# Patient Record
Sex: MALE | Race: WHITE | Employment: OTHER | URBAN - NONMETROPOLITAN AREA
[De-identification: names, ages, dates, MRNs, and addresses within clinical notes are randomized per-mention and may not be internally consistent; named-entity substitution may affect disease eponyms.]

---

## 2017-12-17 ENCOUNTER — APPOINTMENT (OUTPATIENT)
Dept: GENERAL RADIOLOGY | Age: 72
End: 2017-12-17
Payer: MEDICARE

## 2017-12-17 ENCOUNTER — HOSPITAL ENCOUNTER (EMERGENCY)
Age: 72
Discharge: HOME | End: 2017-12-17
Attending: EMERGENCY MEDICINE
Payer: MEDICARE

## 2017-12-17 VITALS
WEIGHT: 180 LBS | SYSTOLIC BLOOD PRESSURE: 180 MMHG | HEIGHT: 71 IN | RESPIRATION RATE: 20 BRPM | HEART RATE: 86 BPM | DIASTOLIC BLOOD PRESSURE: 76 MMHG | OXYGEN SATURATION: 93 % | TEMPERATURE: 97.1 F | BODY MASS INDEX: 25.2 KG/M2

## 2017-12-17 DIAGNOSIS — S80.12XA HEMATOMA OF LEG, LEFT, INITIAL ENCOUNTER: Primary | ICD-10-CM

## 2017-12-17 DIAGNOSIS — M84.364A STRESS FRACTURE OF LEFT FIBULA, INITIAL ENCOUNTER: ICD-10-CM

## 2017-12-17 LAB
HCT VFR BLD CALC: 42.8 % (ref 42–52)
HEMOGLOBIN: 14.1 G/DL (ref 14–18)
INR BLD: 3.15 (ref 0.88–1.18)
MCH RBC QN AUTO: 30.9 PG (ref 27–31)
MCHC RBC AUTO-ENTMCNC: 32.9 G/DL (ref 33–37)
MCV RBC AUTO: 93.7 FL (ref 80–94)
PDW BLD-RTO: 13.2 % (ref 11.5–14.5)
PLATELET # BLD: 236 K/UL (ref 130–400)
PMV BLD AUTO: 9.5 FL (ref 9.4–12.4)
PROTHROMBIN TIME: 32.8 SEC (ref 12–14.6)
RBC # BLD: 4.57 M/UL (ref 4.7–6.1)
WBC # BLD: 7.6 K/UL (ref 4.8–10.8)

## 2017-12-17 PROCEDURE — 85027 COMPLETE CBC AUTOMATED: CPT

## 2017-12-17 PROCEDURE — 99283 EMERGENCY DEPT VISIT LOW MDM: CPT | Performed by: EMERGENCY MEDICINE

## 2017-12-17 PROCEDURE — 99283 EMERGENCY DEPT VISIT LOW MDM: CPT

## 2017-12-17 PROCEDURE — 36415 COLL VENOUS BLD VENIPUNCTURE: CPT

## 2017-12-17 PROCEDURE — 85610 PROTHROMBIN TIME: CPT

## 2017-12-17 PROCEDURE — 73590 X-RAY EXAM OF LOWER LEG: CPT

## 2017-12-17 RX ORDER — WARFARIN SODIUM 5 MG/1
5 TABLET ORAL
COMMUNITY

## 2017-12-17 RX ORDER — LISINOPRIL 20 MG/1
10 TABLET ORAL DAILY
COMMUNITY

## 2017-12-17 RX ORDER — ACETAMINOPHEN 160 MG
1000 TABLET,DISINTEGRATING ORAL DAILY
COMMUNITY

## 2017-12-17 RX ORDER — CARVEDILOL 12.5 MG/1
12.5 TABLET ORAL 2 TIMES DAILY WITH MEALS
COMMUNITY

## 2017-12-17 RX ORDER — CHLORAL HYDRATE 500 MG
CAPSULE ORAL DAILY
COMMUNITY

## 2017-12-17 RX ORDER — WARFARIN SODIUM 7.5 MG/1
7.5 TABLET ORAL
COMMUNITY
End: 2019-07-30

## 2017-12-17 RX ORDER — ATORVASTATIN CALCIUM 80 MG/1
40 TABLET, FILM COATED ORAL DAILY
COMMUNITY

## 2017-12-17 ASSESSMENT — ENCOUNTER SYMPTOMS
SORE THROAT: 0
SHORTNESS OF BREATH: 0
CHEST TIGHTNESS: 0
BLOOD IN STOOL: 0
DIARRHEA: 0
CONSTIPATION: 0
ABDOMINAL PAIN: 0
NAUSEA: 0
VOMITING: 0
BACK PAIN: 0
RHINORRHEA: 0
COUGH: 0
TROUBLE SWALLOWING: 0
ABDOMINAL DISTENTION: 0

## 2017-12-17 NOTE — ED PROVIDER NOTES
[facility]  eMERGENCY dEPARTMENT eNCOUnter      Pt Name: Marietta Isaac  MRN: [id number]  Adengfcandy 1945  Date of evaluation: 12/17/2017  Provider: Tessie Douglas MD    CHIEF COMPLAINT       Chief Complaint   Patient presents with    Bleeding/Bruising       HISTORY OF PRESENT ILLNESS   (Location/Symptom, Timing/Onset, Context/Setting, Quality, Duration, Modifying Factors, Severity)  Note limiting factors. Marietta Isaac is a 71 y.o. male who presents to the emergency department With left medial distal tibial hematoma and bruising. Patient states that he noticed the swelling and bruising yesterday. Denies any known specific trauma to cause event. Concerned due to being on warfarin. States that it is a little bit sore, declining any medication for pain. Wife states that after patient had his valve [facility] he was told that if he had any bruising or bleeding that he was to report to the emergency department for evaluation. He denies any associated chest pain, difficulty breathing, abdominal pain, lightheadedness, rectal bleeding or blood in stools. States that the bruising has been tracking down the leg, no increasing size of the hematoma. No other complaints this point time. Nursing Notes were reviewed. REVIEW OF SYSTEMS    (2-9 systems for level 4, 10 or more for level 5)     Review of Systems   Constitutional: Negative for activity change, appetite change, chills and fever. HENT: Negative for congestion, ear pain, nosebleeds, rhinorrhea, sore throat and trouble swallowing. Respiratory: Negative for cough, chest tightness and shortness of breath. Cardiovascular: Negative for chest pain and palpitations. Gastrointestinal: Negative for abdominal distention, abdominal pain, blood in stool, constipation, diarrhea, nausea and vomiting. Genitourinary: Negative for difficulty urinating, dysuria and hematuria.    Musculoskeletal: Negative for arthralgias, back pain, joint swelling, myalgias and neck pain. Skin: Negative for rash. Positive ecchymosis and hematoma of the left lower extremity   Neurological: Negative for dizziness, weakness, light-headedness and headaches. Psychiatric/Behavioral: Negative for confusion, hallucinations, self-injury and suicidal ideas. PAST MEDICAL HISTORY   History reviewed. No pertinent past medical history. SURGICAL HISTORY     History reviewed. No pertinent surgical history. CURRENT MEDICATIONS       Current Discharge Medication List      CONTINUE these medications which have NOT CHANGED    Details   !! warfarin (COUMADIN) 5 MG tablet Take 5 mg by mouth      !! warfarin (COUMADIN) 7.5 MG tablet Take 7.5 mg by mouth      atorvastatin (LIPITOR) 80 MG tablet Take 40 mg by mouth daily      carvedilol (COREG) 12.5 MG tablet Take 12.5 mg by mouth 2 times daily (with meals)      aspirin 81 MG tablet Take 81 mg by mouth daily      Omega-3 Fatty Acids (FISH OIL) 1000 MG CAPS Take by mouth daily      lisinopril (PRINIVIL;ZESTRIL) 20 MG tablet Take 10 mg by mouth daily      Cholecalciferol (VITAMIN D3) 2000 units CAPS Take 1,000 Units by mouth daily       ! ! - Potential duplicate medications found. Please discuss with provider. ALLERGIES     Nystatin    FAMILY HISTORY     History reviewed. No pertinent family history.     SOCIAL HISTORY       Social History     Social History    Marital status:      Spouse name: N/A    Number of children: N/A    Years of education: N/A     Social History Main Topics    Smoking status: Never Smoker    Smokeless tobacco: Never Used    Alcohol use Yes    Drug use: No    Sexual activity: Not Asked     Other Topics Concern    None     Social History Narrative    None       SCREENINGS           PHYSICAL EXAM    (up to 7 for level 4, 8 or more for level 5)     ED Triage Vitals   BP Temp Temp src Pulse Resp SpO2 Height Weight   -- -- -- -- -- -- -- --       Physical Exam Constitutional: He is oriented to person, place, and time. He appears well-developed and well-nourished. No distress. HENT:   Head: Normocephalic and atraumatic. Mouth/Throat: Oropharynx is clear and moist.   Eyes: EOM are normal. Pupils are equal, round, and reactive to light. No scleral icterus. Neck: Normal range of motion. Neck supple. No tracheal deviation present. Cardiovascular: Normal rate, regular rhythm, normal heart sounds and intact distal pulses. Exam reveals no gallop and no friction rub. No murmur heard. 2+ dorsalis pedis and tibialis posterior on the left lower extremity   Pulmonary/Chest: Effort normal and breath sounds normal. No respiratory distress. He has no wheezes. He has no rales. He exhibits no tenderness. Abdominal: Soft. He exhibits no distension and no mass. There is no tenderness. There is no rebound and no guarding. Musculoskeletal: Normal range of motion. He exhibits no edema, tenderness or deformity. Neurological: He is alert and oriented to person, place, and time. No cranial nerve deficit. He exhibits normal muscle tone. Coordination normal.   Skin: Skin is warm and dry. No rash noted. Left medial distal tibia with small area of hematoma approximately 1 cm in diameter with overlying and surrounding ecchymosis that tracks down in to the foot and posterior tibial area. Psychiatric: He has a normal mood and affect. His behavior is normal. Judgment and thought content normal.   Nursing note and vitals reviewed.       DIAGNOSTIC RESULTS     EKG: All EKG's are interpreted by the Emergency Department Physician who either signs or Co-signs this chart in the absence of a cardiologist.    RADIOLOGY:   Non-plain film images such as CT, Ultrasound and MRI are read by the radiologist. Plain radiographic images are visualized and preliminarily interpreted by the emergency physician with the below findings:    XR TIBIA FIBULA LEFT (2 VIEWS)   Final Result   Impression: Proximal fibular shaft periosteal reaction suggesting recent stress   fracture. No fracture line visible. Adjacent soft tissue swelling. Signed by Dr Shahbaz Mccormick on 12/17/2017 1:15 PM          LABS:  Labs Reviewed   CBC - Abnormal; Notable for the following:        Result Value    RBC 4.57 (*)     MCHC 32.9 (*)     All other components within normal limits   PROTIME-INR - Abnormal; Notable for the following:     Protime 32.8 (*)     INR 3.15 (*)     All other components within normal limits       All other labs were within normal range or not returned as of this dictation. EMERGENCY DEPARTMENT COURSE and DIFFERENTIAL DIAGNOSIS/MDM:   Vitals:    Vitals:    12/17/17 1230 12/17/17 1237 12/17/17 1315   BP:  (!) 163/100 (!) 162/78   Pulse:  81    Resp:  20    SpO2:  93%    Weight: 180 lb (81.6 kg)     Height: 5' 11\" (1.803 m)         MDM  Number of Diagnoses or Management Options  Diagnosis management comments: 71-year-old male on Coumadin for a valve replacement, uncertain which one, presenting with left lower extremity ecchymosis and hematoma, not rapidly expanding, good distal pulses. Uncertain of previous trauma. Plan for INR, CBC and x-ray to rule out any underlying trauma. Emergency Department Course  The patient was seen and evaluated. Blood was drawn, no significant anemia, mild elevation of his INR at 3.15. Patient was instructed to hold his Coumadin for his Monday doses he takes it in the morning and has already taken it today. He is instructed to resume his Coumadin on Tuesday, December 19. X-rays did reveal an healing stress fracture. This is of the proximal fibula and not in the area overlying the hematoma. Patient was instructed to use crutches. He states that he has a pair of crutches at home that he will use. Patient was informed regarding the stress fracture and referred to Dr. Ginny Parham for orthopedic follow-up regarding further treatment.  Patient was instructed to stay off his leg until

## 2017-12-17 NOTE — ED NOTES
ASSESSMENT:    PT ALERT/ORIENTED X4. PUPILS EQUAL/REACTIVE    SKIN:  WARM/DRY PINK CAPILLARY REFILL < 2SECS    CARDIAC:  S1 S2 NOTED      LUNGS: RESPIRATIONS EVEN/UNLABORED    EXTREMITIES:  BILATERAL DP AND PT AND NO EDEMA NOTED. Bruising and swelling noted up the back of left ankle and calf. Patient denies injury. No open areas noted. NO DISTRESS NOTED. SIDE RAILS UP AND CALL LIGHT IN REACH.            Danie Finch RN  12/17/17 2888

## 2018-11-16 ENCOUNTER — HOSPITAL ENCOUNTER (EMERGENCY)
Age: 73
Discharge: HOME | End: 2018-11-16
Attending: EMERGENCY MEDICINE
Payer: COMMERCIAL

## 2018-11-16 ENCOUNTER — APPOINTMENT (OUTPATIENT)
Dept: GENERAL RADIOLOGY | Age: 73
End: 2018-11-16
Payer: COMMERCIAL

## 2018-11-16 VITALS
TEMPERATURE: 98.3 F | DIASTOLIC BLOOD PRESSURE: 84 MMHG | RESPIRATION RATE: 22 BRPM | SYSTOLIC BLOOD PRESSURE: 174 MMHG | WEIGHT: 180 LBS | HEART RATE: 81 BPM | HEIGHT: 71 IN | OXYGEN SATURATION: 94 % | BODY MASS INDEX: 25.2 KG/M2

## 2018-11-16 DIAGNOSIS — L03.114 CELLULITIS OF LEFT ELBOW: Primary | ICD-10-CM

## 2018-11-16 DIAGNOSIS — M70.32 BURSITIS OF LEFT ELBOW, UNSPECIFIED BURSA: ICD-10-CM

## 2018-11-16 DIAGNOSIS — R79.1 SUPRATHERAPEUTIC INR: ICD-10-CM

## 2018-11-16 LAB
ALBUMIN SERPL-MCNC: 3.9 G/DL (ref 3.5–5.2)
ALP BLD-CCNC: 119 U/L (ref 40–130)
ALT SERPL-CCNC: 15 U/L (ref 5–41)
ANION GAP SERPL CALCULATED.3IONS-SCNC: 8 MMOL/L (ref 7–19)
AST SERPL-CCNC: 19 U/L (ref 5–40)
BASOPHILS ABSOLUTE: 0.1 K/UL (ref 0–0.2)
BASOPHILS RELATIVE PERCENT: 0.7 % (ref 0–1)
BILIRUB SERPL-MCNC: 0.5 MG/DL (ref 0.2–1.2)
BUN BLDV-MCNC: 16 MG/DL (ref 8–23)
C-REACTIVE PROTEIN: 3.95 MG/DL (ref 0–0.5)
CALCIUM SERPL-MCNC: 8.8 MG/DL (ref 8.8–10.2)
CHLORIDE BLD-SCNC: 102 MMOL/L (ref 98–111)
CO2: 28 MMOL/L (ref 22–29)
CREAT SERPL-MCNC: 1 MG/DL (ref 0.5–1.2)
EOSINOPHILS ABSOLUTE: 0.2 K/UL (ref 0–0.6)
EOSINOPHILS RELATIVE PERCENT: 3.3 % (ref 0–5)
GFR NON-AFRICAN AMERICAN: >60
GLUCOSE BLD-MCNC: 110 MG/DL (ref 74–109)
HCT VFR BLD CALC: 42.4 % (ref 42–52)
HEMOGLOBIN: 14.1 G/DL (ref 14–18)
INR BLD: 3.44 (ref 0.88–1.18)
LYMPHOCYTES ABSOLUTE: 1.6 K/UL (ref 1.1–4.5)
LYMPHOCYTES RELATIVE PERCENT: 21.3 % (ref 20–40)
MCH RBC QN AUTO: 31.4 PG (ref 27–31)
MCHC RBC AUTO-ENTMCNC: 33.3 G/DL (ref 33–37)
MCV RBC AUTO: 94.4 FL (ref 80–94)
MONOCYTES ABSOLUTE: 0.7 K/UL (ref 0–0.9)
MONOCYTES RELATIVE PERCENT: 9.4 % (ref 0–10)
NEUTROPHILS ABSOLUTE: 4.7 K/UL (ref 1.5–7.5)
NEUTROPHILS RELATIVE PERCENT: 64.9 % (ref 50–65)
PDW BLD-RTO: 13 % (ref 11.5–14.5)
PLATELET # BLD: 228 K/UL (ref 130–400)
PMV BLD AUTO: 9.3 FL (ref 9.4–12.4)
POTASSIUM SERPL-SCNC: 4.8 MMOL/L (ref 3.5–5)
PROTHROMBIN TIME: 34.9 SEC (ref 12–14.6)
RBC # BLD: 4.49 M/UL (ref 4.7–6.1)
SEDIMENTATION RATE, ERYTHROCYTE: 15 MM/HR (ref 0–15)
SODIUM BLD-SCNC: 138 MMOL/L (ref 136–145)
TOTAL PROTEIN: 7.3 G/DL (ref 6.6–8.7)
WBC # BLD: 7.3 K/UL (ref 4.8–10.8)

## 2018-11-16 PROCEDURE — 80053 COMPREHEN METABOLIC PANEL: CPT

## 2018-11-16 PROCEDURE — 99283 EMERGENCY DEPT VISIT LOW MDM: CPT

## 2018-11-16 PROCEDURE — 73080 X-RAY EXAM OF ELBOW: CPT

## 2018-11-16 PROCEDURE — 86140 C-REACTIVE PROTEIN: CPT

## 2018-11-16 PROCEDURE — 99284 EMERGENCY DEPT VISIT MOD MDM: CPT | Performed by: NURSE PRACTITIONER

## 2018-11-16 PROCEDURE — 36415 COLL VENOUS BLD VENIPUNCTURE: CPT

## 2018-11-16 PROCEDURE — 85025 COMPLETE CBC W/AUTO DIFF WBC: CPT

## 2018-11-16 PROCEDURE — 85610 PROTHROMBIN TIME: CPT

## 2018-11-16 PROCEDURE — 85652 RBC SED RATE AUTOMATED: CPT

## 2018-11-16 RX ORDER — SULFAMETHOXAZOLE AND TRIMETHOPRIM 800; 160 MG/1; MG/1
1 TABLET ORAL 2 TIMES DAILY
Qty: 20 TABLET | Refills: 0 | Status: SHIPPED | OUTPATIENT
Start: 2018-11-16 | End: 2018-11-26

## 2018-11-16 RX ORDER — HYDROCODONE BITARTRATE AND ACETAMINOPHEN 5; 325 MG/1; MG/1
1 TABLET ORAL EVERY 6 HOURS PRN
Qty: 10 TABLET | Refills: 0 | Status: SHIPPED | OUTPATIENT
Start: 2018-11-16 | End: 2018-11-19

## 2018-11-16 RX ORDER — CEPHALEXIN 500 MG/1
500 CAPSULE ORAL 3 TIMES DAILY
Qty: 30 CAPSULE | Refills: 0 | Status: SHIPPED | OUTPATIENT
Start: 2018-11-16 | End: 2018-11-26

## 2018-11-16 ASSESSMENT — ENCOUNTER SYMPTOMS
COLOR CHANGE: 1
RESPIRATORY NEGATIVE: 1

## 2018-11-16 NOTE — ED PROVIDER NOTES
dailyHistorical Med      lisinopril (PRINIVIL;ZESTRIL) 20 MG tablet Take 10 mg by mouth dailyHistorical Med      Cholecalciferol (VITAMIN D3) 2000 units CAPS Take 1,000 Units by mouth dailyHistorical Med       !! - Potential duplicate medications found. Please discuss with provider. ALLERGIES     Nystatin    FAMILY HISTORY     No family history on file. SOCIAL HISTORY       Social History     Social History    Marital status:      Spouse name: N/A    Number of children: N/A    Years of education: N/A     Social History Main Topics    Smoking status: Never Smoker    Smokeless tobacco: Never Used    Alcohol use Yes    Drug use: No    Sexual activity: Not on file     Other Topics Concern    Not on file     Social History Narrative    No narrative on file       SCREENINGS             PHYSICAL EXAM    (up to 7 for level 4, 8 or more for level 5)     ED Triage Vitals   BP Temp Temp src Pulse Resp SpO2 Height Weight   -- -- -- -- -- -- -- --     Vitals:    11/16/18 1045 11/16/18 1046 11/16/18 1200 11/16/18 1252   BP:  (!) 148/69 (!) 144/72 (!) 174/84   Pulse: 84 68 75 81   Resp:  18 18 22   Temp: 98.4 °F (36.9 °C) 98.4 °F (36.9 °C)  98.3 °F (36.8 °C)   TempSrc:    Oral   SpO2:  92% 93% 94%   Weight:  180 lb (81.6 kg)     Height:  5' 11\" (1.803 m)           Physical Exam   Constitutional: He is oriented to person, place, and time. He appears well-nourished. HENT:   Head: Normocephalic. Neck: Normal range of motion. Cardiovascular: Normal rate. Pulmonary/Chest: Effort normal.   Abdominal: Soft. Musculoskeletal: Normal range of motion. Left medial elbow with mild induration and erythema  Healing abrasion olecranon area left elbow  Normal flexion/extension of left  Compartments of left forearm and upper arm soft and nontender  CMS intact left hand   Neurological: He is alert and oriented to person, place, and time. Skin: Skin is warm and dry. Vitals reviewed.       DIAGNOSTIC MDM      CONSULTS:  None    PROCEDURES:  Unless otherwise notedbelow, none     Procedures    FINAL IMPRESSION     1. Cellulitis of left elbow    2. Bursitis of left elbow, unspecified bursa    3. Supratherapeutic INR          DISPOSITION/PLAN   DISPOSITION        PATIENT REFERRED TO:  your doctor next week            DISCHARGE MEDICATIONS:       Discharge Medication List as of 11/16/2018 12:52 PM           Medication List      START taking these medications    cephALEXin 500 MG capsule  Commonly known as:  KEFLEX  Take 1 capsule by mouth 3 times daily for 10 days     HYDROcodone-acetaminophen 5-325 MG per tablet  Commonly known as:  NORCO  Take 1 tablet by mouth every 6 hours as needed for Pain for up to 3 days. Intended supply: 3 days. Take lowest dose possible to manage pain. sulfamethoxazole-trimethoprim 800-160 MG per tablet  Commonly known as:  BACTRIM DS  Take 1 tablet by mouth 2 times daily for 10 days        ASK your doctor about these medications    aspirin 81 MG tablet     atorvastatin 80 MG tablet  Commonly known as:  LIPITOR     carvedilol 12.5 MG tablet  Commonly known as:  COREG     fish oil 1000 MG Caps     lisinopril 20 MG tablet  Commonly known as:  PRINIVIL;ZESTRIL     Vitamin D3 2000 units Caps     * warfarin 5 MG tablet  Commonly known as:  COUMADIN     * warfarin 7.5 MG tablet  Commonly known as:  COUMADIN        * This list has 2 medication(s) that are the same as other medications prescribed for you. Read the directions carefully, and ask your doctor or other care provider to review them with you.                Where to Get Your Medications      You can get these medications from any pharmacy    Bring a paper prescription for each of these medications  · cephALEXin 500 MG capsule  · HYDROcodone-acetaminophen 5-325 MG per tablet  · sulfamethoxazole-trimethoprim 800-160 MG per tablet         (Pleasenote that portions of this note were completed with a voice recognition program.

## 2019-07-30 ENCOUNTER — HOSPITAL ENCOUNTER (EMERGENCY)
Age: 74
Discharge: HOME | End: 2019-07-30
Payer: MEDICARE

## 2019-07-30 ENCOUNTER — APPOINTMENT (OUTPATIENT)
Dept: GENERAL RADIOLOGY | Age: 74
End: 2019-07-30
Payer: MEDICARE

## 2019-07-30 VITALS
HEART RATE: 76 BPM | RESPIRATION RATE: 20 BRPM | BODY MASS INDEX: 23.8 KG/M2 | HEIGHT: 71 IN | TEMPERATURE: 98 F | WEIGHT: 170 LBS | SYSTOLIC BLOOD PRESSURE: 123 MMHG | DIASTOLIC BLOOD PRESSURE: 76 MMHG | OXYGEN SATURATION: 98 %

## 2019-07-30 DIAGNOSIS — R63.0 DECREASE IN APPETITE: ICD-10-CM

## 2019-07-30 DIAGNOSIS — R03.1 LOW BLOOD PRESSURE, NOT HYPOTENSION: Primary | ICD-10-CM

## 2019-07-30 DIAGNOSIS — N39.0 URINARY TRACT INFECTION WITHOUT HEMATURIA, SITE UNSPECIFIED: ICD-10-CM

## 2019-07-30 LAB
ALBUMIN SERPL-MCNC: 3.5 G/DL (ref 3.5–5.2)
ALP BLD-CCNC: 129 U/L (ref 40–130)
ALT SERPL-CCNC: 27 U/L (ref 5–41)
AMORPHOUS: ABNORMAL /HPF
AMYLASE: 61 U/L (ref 28–100)
ANION GAP SERPL CALCULATED.3IONS-SCNC: 12 MMOL/L (ref 7–19)
APTT: 36 SEC (ref 26–36.2)
AST SERPL-CCNC: 34 U/L (ref 5–40)
BACTERIA: ABNORMAL /HPF
BASOPHILS ABSOLUTE: 0 K/UL (ref 0–0.2)
BASOPHILS RELATIVE PERCENT: 0.3 % (ref 0–1)
BILIRUB SERPL-MCNC: 0.6 MG/DL (ref 0.2–1.2)
BILIRUBIN URINE: NEGATIVE
BLOOD, URINE: ABNORMAL
BUN BLDV-MCNC: 30 MG/DL (ref 8–23)
CALCIUM SERPL-MCNC: 9.1 MG/DL (ref 8.8–10.2)
CHLORIDE BLD-SCNC: 95 MMOL/L (ref 98–111)
CLARITY: ABNORMAL
CO2: 26 MMOL/L (ref 22–29)
COLOR: ABNORMAL
CREAT SERPL-MCNC: 1.5 MG/DL (ref 0.5–1.2)
EOSINOPHILS ABSOLUTE: 0.1 K/UL (ref 0–0.6)
EOSINOPHILS RELATIVE PERCENT: 1.6 % (ref 0–5)
EPITHELIAL CELLS, UA: 0 /HPF (ref 0–5)
EPITHELIAL CELLS, UA: ABNORMAL /HPF
GFR NON-AFRICAN AMERICAN: 46
GLUCOSE BLD-MCNC: 127 MG/DL (ref 74–109)
GLUCOSE URINE: NEGATIVE MG/DL
HCT VFR BLD CALC: 38.8 % (ref 42–52)
HEMOGLOBIN: 12.8 G/DL (ref 14–18)
HYALINE CASTS: 6 /HPF (ref 0–8)
INR BLD: 1.58 (ref 0.88–1.18)
KETONES, URINE: NEGATIVE MG/DL
LACTIC ACID: 0.7 MMOL/L (ref 0.5–1.9)
LEUKOCYTE ESTERASE, URINE: ABNORMAL
LIPASE: 47 U/L (ref 13–60)
LYMPHOCYTES ABSOLUTE: 1.2 K/UL (ref 1.1–4.5)
LYMPHOCYTES RELATIVE PERCENT: 13.1 % (ref 20–40)
MCH RBC QN AUTO: 31.3 PG (ref 27–31)
MCHC RBC AUTO-ENTMCNC: 33 G/DL (ref 33–37)
MCV RBC AUTO: 94.9 FL (ref 80–94)
MONOCYTES ABSOLUTE: 1.1 K/UL (ref 0–0.9)
MONOCYTES RELATIVE PERCENT: 12 % (ref 0–10)
NEUTROPHILS ABSOLUTE: 6.5 K/UL (ref 1.5–7.5)
NEUTROPHILS RELATIVE PERCENT: 72.6 % (ref 50–65)
NITRITE, URINE: POSITIVE
PDW BLD-RTO: 12.9 % (ref 11.5–14.5)
PH UA: 5.5 (ref 5–8)
PLATELET # BLD: 207 K/UL (ref 130–400)
PMV BLD AUTO: 9.8 FL (ref 9.4–12.4)
POTASSIUM REFLEX MAGNESIUM: 4.2 MMOL/L (ref 3.5–5)
PRO-BNP: 3384 PG/ML (ref 0–900)
PROTEIN UA: 30 MG/DL
PROTHROMBIN TIME: 18.2 SEC (ref 12–14.6)
RBC # BLD: 4.09 M/UL (ref 4.7–6.1)
RBC UA: 83 /HPF (ref 0–4)
RBC UA: ABNORMAL /HPF (ref 0–2)
SODIUM BLD-SCNC: 133 MMOL/L (ref 136–145)
SPECIFIC GRAVITY UA: 1.02 (ref 1–1.03)
TOTAL PROTEIN: 7.3 G/DL (ref 6.6–8.7)
TROPONIN: <0.01 NG/ML (ref 0–0.03)
URINE REFLEX TO CULTURE: YES
UROBILINOGEN, URINE: 1 E.U./DL
WBC # BLD: 8.9 K/UL (ref 4.8–10.8)
WBC UA: 25 /HPF (ref 0–5)
WBC UA: ABNORMAL /HPF (ref 0–5)

## 2019-07-30 PROCEDURE — 85610 PROTHROMBIN TIME: CPT

## 2019-07-30 PROCEDURE — 83880 ASSAY OF NATRIURETIC PEPTIDE: CPT

## 2019-07-30 PROCEDURE — 84484 ASSAY OF TROPONIN QUANT: CPT

## 2019-07-30 PROCEDURE — 71046 X-RAY EXAM CHEST 2 VIEWS: CPT

## 2019-07-30 PROCEDURE — 83690 ASSAY OF LIPASE: CPT

## 2019-07-30 PROCEDURE — 83605 ASSAY OF LACTIC ACID: CPT

## 2019-07-30 PROCEDURE — 82150 ASSAY OF AMYLASE: CPT

## 2019-07-30 PROCEDURE — 85730 THROMBOPLASTIN TIME PARTIAL: CPT

## 2019-07-30 PROCEDURE — 87186 SC STD MICRODIL/AGAR DIL: CPT

## 2019-07-30 PROCEDURE — 80053 COMPREHEN METABOLIC PANEL: CPT

## 2019-07-30 PROCEDURE — 85025 COMPLETE CBC W/AUTO DIFF WBC: CPT

## 2019-07-30 PROCEDURE — 2580000003 HC RX 258: Performed by: NURSE PRACTITIONER

## 2019-07-30 PROCEDURE — 81001 URINALYSIS AUTO W/SCOPE: CPT

## 2019-07-30 PROCEDURE — 36415 COLL VENOUS BLD VENIPUNCTURE: CPT

## 2019-07-30 PROCEDURE — 87077 CULTURE AEROBIC IDENTIFY: CPT

## 2019-07-30 PROCEDURE — 87086 URINE CULTURE/COLONY COUNT: CPT

## 2019-07-30 PROCEDURE — 93005 ELECTROCARDIOGRAM TRACING: CPT

## 2019-07-30 PROCEDURE — 99283 EMERGENCY DEPT VISIT LOW MDM: CPT | Performed by: NURSE PRACTITIONER

## 2019-07-30 PROCEDURE — 99284 EMERGENCY DEPT VISIT MOD MDM: CPT

## 2019-07-30 RX ORDER — 0.9 % SODIUM CHLORIDE 0.9 %
1000 INTRAVENOUS SOLUTION INTRAVENOUS ONCE
Status: COMPLETED | OUTPATIENT
Start: 2019-07-30 | End: 2019-07-30

## 2019-07-30 RX ADMIN — SODIUM CHLORIDE 1000 ML: 9 INJECTION, SOLUTION INTRAVENOUS at 16:46

## 2019-07-30 ASSESSMENT — ENCOUNTER SYMPTOMS
SORE THROAT: 0
NAUSEA: 0
ABDOMINAL PAIN: 0
DIARRHEA: 0
COUGH: 0
WHEEZING: 0
BACK PAIN: 0
SHORTNESS OF BREATH: 0
EYE DISCHARGE: 0
VOMITING: 0

## 2019-07-30 NOTE — ED PROVIDER NOTES
Attending Supervisory Note/Shared Visit    I have reviewed the mid-levels findings and agree. We have discussed the case and reviewed the diagnostic data together. I agree with the plan and disposition.   Jeannette Suárez MD  Attending Emergency Physician        Jeannette Suárez MD  07/30/19 [id number]

## 2019-07-31 LAB
EKG P AXIS: 57 DEGREES
EKG P-R INTERVAL: 196 MS
EKG Q-T INTERVAL: 400 MS
EKG QRS DURATION: 98 MS
EKG QTC CALCULATION (BAZETT): 414 MS
EKG T AXIS: 53 DEGREES

## 2019-08-01 LAB
ORGANISM: ABNORMAL
URINE CULTURE, ROUTINE: ABNORMAL
URINE CULTURE, ROUTINE: ABNORMAL